# Patient Record
Sex: FEMALE | URBAN - NONMETROPOLITAN AREA
[De-identification: names, ages, dates, MRNs, and addresses within clinical notes are randomized per-mention and may not be internally consistent; named-entity substitution may affect disease eponyms.]

---

## 2022-07-22 ENCOUNTER — OFFICE VISIT (OUTPATIENT)
Dept: CARDIOLOGY CLINIC | Age: 27
End: 2022-07-22
Payer: MEDICAID

## 2022-07-22 VITALS
SYSTOLIC BLOOD PRESSURE: 112 MMHG | HEIGHT: 62 IN | WEIGHT: 187 LBS | OXYGEN SATURATION: 98 % | BODY MASS INDEX: 34.41 KG/M2 | HEART RATE: 90 BPM | DIASTOLIC BLOOD PRESSURE: 62 MMHG

## 2022-07-22 DIAGNOSIS — R00.0 TACHYCARDIA: Primary | ICD-10-CM

## 2022-07-22 DIAGNOSIS — R01.1 MURMUR: ICD-10-CM

## 2022-07-22 PROCEDURE — G8417 CALC BMI ABV UP PARAM F/U: HCPCS | Performed by: INTERNAL MEDICINE

## 2022-07-22 PROCEDURE — 1036F TOBACCO NON-USER: CPT | Performed by: INTERNAL MEDICINE

## 2022-07-22 PROCEDURE — 99203 OFFICE O/P NEW LOW 30 MIN: CPT | Performed by: INTERNAL MEDICINE

## 2022-07-22 PROCEDURE — G8428 CUR MEDS NOT DOCUMENT: HCPCS | Performed by: INTERNAL MEDICINE

## 2022-07-22 PROCEDURE — 93000 ELECTROCARDIOGRAM COMPLETE: CPT | Performed by: INTERNAL MEDICINE

## 2022-07-22 PROCEDURE — 93244 EXT ECG>48HR<7D REV&INTERPJ: CPT | Performed by: INTERNAL MEDICINE

## 2022-07-22 RX ORDER — LATANOPROST 50 UG/ML
SOLUTION/ DROPS OPHTHALMIC
COMMUNITY
Start: 2022-05-28

## 2022-07-22 RX ORDER — LIOTHYRONINE SODIUM 25 UG/1
TABLET ORAL
COMMUNITY
Start: 2022-07-15

## 2022-07-22 RX ORDER — FLUDROCORTISONE ACETATE 0.1 MG/1
TABLET ORAL
COMMUNITY
Start: 2022-07-15

## 2022-07-22 RX ORDER — METOPROLOL SUCCINATE 25 MG/1
TABLET, EXTENDED RELEASE ORAL
COMMUNITY
Start: 2022-07-18 | End: 2022-07-22

## 2022-07-22 RX ORDER — LEVOTHYROXINE SODIUM 0.07 MG/1
TABLET ORAL
COMMUNITY
Start: 2022-07-15

## 2022-07-22 NOTE — PROGRESS NOTES
[address]   Cardiac Evaluation      Patient: Ayse Montague  YOB: 1995         Chief Complaint   Patient presents with    Establish Cardiologist    Tachycardia        Referring provider: Asia Barba DO    History of Present Illness:   Ms Lilian Post is seen today as a new patient. She states she has been tachycardic since having Covid. Her pcp heard a murmur, as well. Rachel takes Florinef for orthostatic hypotension. She takes medication for T3/T4 and synthroid for hypothyroidism. She states she had thyroid levels checked 2 months ago (do not have record of this). 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 Bethany Paulino lives with her dad. She drinks alcohol very rarely. Up until her dad had his accident she did more regular exercise. Rachel denies chest pain, palpitations, WILLSON, dizziness, or edema. Past Medical History:   has a past medical history of Lyme disease and Thyroid disease. Surgical History:   has no past surgical history on file. Current Outpatient Medications   Medication Sig Dispense Refill    metoprolol succinate (TOPROL XL) 25 MG extended release tablet TAKE ONE TABLET BY MOUTH EVERY DAY      fludrocortisone (FLORINEF) 0.1 MG tablet TAKE ONE TABLET BY MOUTH TWICE DAILY AS DIRECTED      liothyronine (CYTOMEL) 25 MCG tablet TAKE 2 & 1/2 TABLETS BY MOUTH DAILY      latanoprost (XALATAN) 0.005 % ophthalmic solution Instill 1 drop into both eyes at bedtime      levothyroxine (SYNTHROID) 75 MCG tablet Take 1 tablet By Mouth once a day       No current facility-administered medications for this visit.        Allergies:  Prednisone     Social History:  Social History     Socioeconomic History    Marital status: Single     Spouse name: Not on file    Number of children: Not on file    Years of education: Not on file    Highest education level: Not on file   Occupational History    Not on file Tobacco Use    Smoking status: Never    Smokeless tobacco: Never   Substance and Sexual Activity    Alcohol use: Not Currently    Drug use: Not on file    Sexual activity: Not on file   Other Topics Concern    Not on file   Social History Narrative    Not on file     Social Determinants of Health     Financial Resource Strain: Not on file   Food Insecurity: Not on file   Transportation Needs: Not on file   Physical Activity: Not on file   Stress: Not on file   Social Connections: Not on file   Intimate Partner Violence: Not on file   Housing Stability: Not on file       Family History:   Family History   Problem Relation Age of Onset    Hypertension Father     Arrhythmia Paternal Uncle      Family history has been reviewed and not pertinent except as noted above. Review of Systems:   Constitutional: there has been no unanticipated weight loss. No change in energy or activity level   Eyes: No visual changes   ENT: No Headaches, hearing loss or vertigo. No mouth sores or sore throat. Cardiovascular: Reviewed in HPI  Respiratory: No cough or wheezing, no sputum production. Gastrointestinal: No abdominal pain, appetite loss, blood in stools. No change in bowel or bladder habits. Genitourinary: No nocturia, dysuria, trouble voiding  Musculoskeletal:  No gait disturbance, weakness or joint complaints. Integumentary: No rash or pruritis. Neurological: No headache, change in muscle strength, numbness or tingling. No change in gait, balance, coordination, mood, affect, memory, mentation, behavior. Psychiatric: No anxiety or depression  Endocrine: No malaise or fever  Hematologic/Lymphatic: No abnormal bruising or bleeding, blood clots or swollen lymph nodes. Allergic/Immunologic: No nasal congestion or hives.     Physical Examination:    Vitals:    07/22/22 1126   BP: 112/62   Site: Left Upper Arm   Position: Sitting   Cuff Size: Medium Adult   Pulse: 90   SpO2: 98%   Weight: 187 lb (84.8 kg)   Height: 5' 2\" (1.575 m)     Body mass index is 34.2 kg/m². Wt Readings from Last 3 Encounters:   07/22/22 187 lb (84.8 kg)      BP Readings from Last 3 Encounters:   07/22/22 112/62        Physical Examination:    CONSTITUTIONAL: Well developed, well nourished  EYES: PERRLA. No xanthelasma, sclera non icteric  EARS,NOSE,MOUTH,THROAT:  Mucous membranes moist, normal hearing  NECK: Supple, JVP normal, thyroid not enlarged. Carotids 2+ without bruits  RESPIRATORY: Normal effort, no rales or rhonchi  CARDIOVASCULAR: Normal PMI, regular rate and rhythm, no rub or gallop. No edema. Radial pulses present and equal  1/6 PHAM  CHEST: No scar or masses  ABDOMEN: Normal bowel sounds. No masses or tenderness. No bruit  MUSCULOSKELETAL: No clubbing or cyanosis. Moves all extremities well. Normal gait  SKIN:  Warm and dry. No rashes  NEUROLOGIC: Cranial nerves intact. Alert and oriented  PSYCHIATRIC: Calm affect. Appears to have normal judgement and insight        Assessment/Plan  1. Tachycardia -EKG>sinus rhythm, HR 92bpm. She was started on Toprol XL 25mg daily approx 2 weeks ago by pcp  She states when she is at home her HR runs 60's-70's   2. Murmur - 1/6 PHAM which is most likely a flow murmur. 3.     Orthostatic hypotension - lying 140/70, sitting 144/66, standing 144/66    PLAN:  Will obtain an echo. It is recommended that she decrease Florinef to once daily. Will stop Toprol XL and return next week for 7 day cardiac monitor. Scribe's attestation: This note was scribed in the presence of Dr Bertha Clarke MD by Phill Tabares RN. The scribe's documentation has been prepared under my direction and personally reviewed by me in its entirety. I confirm that the note above accurately reflects all work, treatment, procedures, and medical decision making performed by me. Thank you for allowing to me to participate in the care of Ki People.

## 2022-08-04 NOTE — PROGRESS NOTES
[facility]   Cardiac Evaluation      Patient: Jacquelyn Hoang  YOB: 1995         Chief Complaint   Patient presents with    Palpitations          Referring provider: [facility] DO [facility]    History of Present Illness:   Ms Wanda Caba is seen today for echo and OV. She states she has been tachycardic since having Covid. Her pcp heard a murmur, as well. Rachel takes Florinef for orthostatic hypotension. She takes medication for T3/T4 and synthroid for hypothyroidism. She states she had thyroid levels checked 2 months ago (do not have record of this). 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 Rafael Adams lives with her dad. She drinks alcohol very rarely. Up until her dad had his accident she did more regular exercise. Today, Ms Wanda Caba states her thyroid level has been checked and it was normal (we do not have record of this). Rachel denies chest pain, WILLSON, dizziness, or edema. She wore a 7 day cardiac monitor. Her sleep pattern was 6am-2pm during the time she wore the monitor. She continues to take florinef 2 tabs daily even though she does not have orthostatic hypotension because it makes her feel better. She does not have regular sleeping hours and prefers to work until she is very fatigued according to her \"circadian rhythm\". Past Medical History:   has a past medical history of Lyme disease and Thyroid disease. Surgical History:   has no past surgical history on file.      Current Outpatient Medications   Medication Sig Dispense Refill    fludrocortisone (FLORINEF) 0.1 MG tablet TAKE ONE TABLET BY MOUTH TWICE DAILY AS DIRECTED      liothyronine (CYTOMEL) 25 MCG tablet TAKE 2 & 1/2 TABLETS BY MOUTH DAILY      latanoprost (XALATAN) 0.005 % ophthalmic solution Instill 1 drop into both eyes at bedtime      levothyroxine (SYNTHROID) 75 MCG tablet Take 1 tablet By Mouth once a day       No current facility-administered medications for this visit. Allergies:  Prednisone     Social History:  Social History     Socioeconomic History    Marital status: Single     Spouse name: Not on file    Number of children: Not on file    Years of education: Not on file    Highest education level: Not on file   Occupational History    Not on file   Tobacco Use    Smoking status: Never    Smokeless tobacco: Never   Substance and Sexual Activity    Alcohol use: Not Currently    Drug use: Not on file    Sexual activity: Not on file   Other Topics Concern    Not on file   Social History Narrative    Not on file     Social Determinants of Health     Financial Resource Strain: Not on file   Food Insecurity: Not on file   Transportation Needs: Not on file   Physical Activity: Not on file   Stress: Not on file   Social Connections: Not on file   Intimate Partner Violence: Not on file   Housing Stability: Not on file       Family History:   Family History   Problem Relation Age of Onset    Hypertension Father     Arrhythmia Paternal Uncle      Family history has been reviewed and not pertinent except as noted above. Review of Systems:   Constitutional: there has been no unanticipated weight loss. No change in energy or activity level   Eyes: No visual changes   ENT: No Headaches, hearing loss or vertigo. No mouth sores or sore throat. Cardiovascular: Reviewed in HPI  Respiratory: No cough or wheezing, no sputum production. Gastrointestinal: No abdominal pain, appetite loss, blood in stools. No change in bowel or bladder habits. Genitourinary: No nocturia, dysuria, trouble voiding  Musculoskeletal:  No gait disturbance, weakness or joint complaints. Integumentary: No rash or pruritis. Neurological: No headache, change in muscle strength, numbness or tingling. No change in gait, balance, coordination, mood, affect, memory, mentation, behavior.   Psychiatric: No anxiety or depression  Endocrine: No malaise or fever  Hematologic/Lymphatic: No abnormal bruising or bleeding, blood clots or swollen lymph nodes. Allergic/Immunologic: No nasal congestion or hives. Physical Examination:    Vitals:    08/05/22 0917   BP: 112/70   Site: Left Upper Arm   Position: Sitting   Cuff Size: Medium Adult   Pulse: 98   SpO2: 99%   Weight: 187 lb (84.8 kg)   Height: 5' 2\" (1.575 m)       Body mass index is 34.2 kg/m². Wt Readings from Last 3 Encounters:   08/05/22 187 lb (84.8 kg)   07/22/22 187 lb (84.8 kg)      BP Readings from Last 3 Encounters:   08/05/22 112/70   07/22/22 112/62        Physical Examination:    CONSTITUTIONAL: Well developed, well nourished  EYES: PERRLA. No xanthelasma, sclera non icteric  EARS,NOSE,MOUTH,THROAT:  Mucous membranes moist, normal hearing  NECK: Supple, JVP normal, thyroid not enlarged. Carotids 2+ without bruits  RESPIRATORY: Normal effort, no rales or rhonchi  CARDIOVASCULAR: Normal PMI, regular rate and rhythm, no rub or gallop. No edema. Radial pulses present and equal  1/6 PHAM  CHEST: No scar or masses  ABDOMEN: Normal bowel sounds. No masses or tenderness. No bruit  MUSCULOSKELETAL: No clubbing or cyanosis. Moves all extremities well. Normal gait  SKIN:  Warm and dry. No rashes  NEUROLOGIC: Cranial nerves intact. Alert and oriented  PSYCHIATRIC: Calm affect. Appears to have normal judgement and insight        Assessment/Plan  1. Tachycardia -EKG 7/22/22>sinus rhythm, HR 92bpm. She was started on Toprol XL 25mg daily by pcp   2. Murmur - 1/6 PHAM which is most likely a flow murmur. Echo 8/5/22: Normal left ventricle size, wall thickness and systolic function with EF of 65%. No regional wall motion abnormalities are seen. E/e\"= 6. Diastolic filling parameters suggests normal diastolic function. Trivial mitral regurgitation. IVC size is normal (<2.1cm) and collapses > 50% with respiration consistent with normal RA pressure (3mmHg).    3.     Orthostatic hypotension -  she was previously advised to decrease Florinef to once daily, she did not do this. She is still taking BID    PLAN:  Echo findings discussed. Will call with monitor results when obtained. Labs for CRP, sed rate, and CMP. Scribe's attestation: This note was scribed in the presence of Dr Shiraz Olsen MD by Bj Metz RN. The scribe's documentation has been prepared under my direction and personally reviewed by me in its entirety. I confirm that the note above accurately reflects all work, treatment, procedures, and medical decision making performed by me. Thank you for allowing to me to participate in the care of Jacquelyn Hoang.

## 2022-08-05 ENCOUNTER — OFFICE VISIT (OUTPATIENT)
Dept: CARDIOLOGY CLINIC | Age: 27
End: 2022-08-05
Payer: MEDICAID

## 2022-08-05 ENCOUNTER — PROCEDURE VISIT (OUTPATIENT)
Dept: CARDIOLOGY CLINIC | Age: 27
End: 2022-08-05
Payer: MEDICAID

## 2022-08-05 VITALS
SYSTOLIC BLOOD PRESSURE: 112 MMHG | WEIGHT: 187 LBS | BODY MASS INDEX: 34.41 KG/M2 | DIASTOLIC BLOOD PRESSURE: 70 MMHG | HEART RATE: 98 BPM | OXYGEN SATURATION: 99 % | HEIGHT: 62 IN

## 2022-08-05 DIAGNOSIS — R01.1 MURMUR: ICD-10-CM

## 2022-08-05 DIAGNOSIS — R00.0 TACHYCARDIA: Primary | ICD-10-CM

## 2022-08-05 DIAGNOSIS — R00.0 TACHYCARDIA: ICD-10-CM

## 2022-08-05 LAB
LV EF: 65 %
LVEF MODALITY: NORMAL

## 2022-08-05 PROCEDURE — G8428 CUR MEDS NOT DOCUMENT: HCPCS | Performed by: INTERNAL MEDICINE

## 2022-08-05 PROCEDURE — 93306 TTE W/DOPPLER COMPLETE: CPT | Performed by: INTERNAL MEDICINE

## 2022-08-05 PROCEDURE — 99214 OFFICE O/P EST MOD 30 MIN: CPT | Performed by: INTERNAL MEDICINE

## 2022-08-05 PROCEDURE — 1036F TOBACCO NON-USER: CPT | Performed by: INTERNAL MEDICINE

## 2022-08-05 PROCEDURE — G8417 CALC BMI ABV UP PARAM F/U: HCPCS | Performed by: INTERNAL MEDICINE

## 2022-08-08 PROCEDURE — 93242 EXT ECG>48HR<7D RECORDING: CPT | Performed by: INTERNAL MEDICINE

## 2022-08-16 ENCOUNTER — TELEPHONE (OUTPATIENT)
Dept: CARDIOLOGY CLINIC | Age: 27
End: 2022-08-16

## 2022-08-16 RX ORDER — METOPROLOL SUCCINATE 25 MG/1
TABLET, EXTENDED RELEASE ORAL
Qty: 30 TABLET | Refills: 0
Start: 2022-08-16

## 2022-08-16 NOTE — TELEPHONE ENCOUNTER
Dr Sridevi Maharaj reviewed patient's cardiac monitor. Monitor showed SR w/ sinus tach, per Dr Sridevi Maharaj she should restart Toprol XL 25mg daily. I called Ms Edgardo Huerta and notified her of monitor results and to restart Toprol XL 25mg daily. She verbalized understanding.